# Patient Record
Sex: FEMALE | Race: WHITE | NOT HISPANIC OR LATINO | Employment: OTHER | URBAN - METROPOLITAN AREA
[De-identification: names, ages, dates, MRNs, and addresses within clinical notes are randomized per-mention and may not be internally consistent; named-entity substitution may affect disease eponyms.]

---

## 2017-08-11 ENCOUNTER — ALLSCRIPTS OFFICE VISIT (OUTPATIENT)
Dept: OTHER | Facility: OTHER | Age: 72
End: 2017-08-11

## 2017-08-11 DIAGNOSIS — Z12.31 ENCOUNTER FOR SCREENING MAMMOGRAM FOR MALIGNANT NEOPLASM OF BREAST: ICD-10-CM

## 2017-10-19 ENCOUNTER — GENERIC CONVERSION - ENCOUNTER (OUTPATIENT)
Dept: OTHER | Facility: OTHER | Age: 72
End: 2017-10-19

## 2018-01-12 VITALS
WEIGHT: 233.25 LBS | DIASTOLIC BLOOD PRESSURE: 72 MMHG | BODY MASS INDEX: 38.86 KG/M2 | HEIGHT: 65 IN | SYSTOLIC BLOOD PRESSURE: 122 MMHG

## 2019-12-18 ENCOUNTER — ANNUAL EXAM (OUTPATIENT)
Dept: OBGYN CLINIC | Facility: MEDICAL CENTER | Age: 74
End: 2019-12-18
Payer: COMMERCIAL

## 2019-12-18 VITALS — WEIGHT: 220 LBS | BODY MASS INDEX: 37.18 KG/M2 | SYSTOLIC BLOOD PRESSURE: 120 MMHG | DIASTOLIC BLOOD PRESSURE: 70 MMHG

## 2019-12-18 DIAGNOSIS — Z01.419 ENCOUNTER FOR WELL WOMAN EXAM WITH ROUTINE GYNECOLOGICAL EXAM: Primary | ICD-10-CM

## 2019-12-18 DIAGNOSIS — Z12.31 ENCOUNTER FOR SCREENING MAMMOGRAM FOR MALIGNANT NEOPLASM OF BREAST: ICD-10-CM

## 2019-12-18 PROCEDURE — G0101 CA SCREEN;PELVIC/BREAST EXAM: HCPCS | Performed by: OBSTETRICS & GYNECOLOGY

## 2019-12-18 RX ORDER — SODIUM PHOSPHATE,MONO-DIBASIC 19G-7G/118
1500 ENEMA (ML) RECTAL
COMMUNITY
Start: 2006-07-14

## 2019-12-18 RX ORDER — NIACIN 100 MG
100 TABLET ORAL
COMMUNITY
Start: 2019-05-08 | End: 2022-01-31 | Stop reason: ALTCHOICE

## 2019-12-18 NOTE — PROGRESS NOTES
ASSESSMENT & PLAN: Christelle Chang is a 75 y o  [id number] with normal gynecologic exam     1   Routine well woman exam done today  2  Pap and HPV:  The patient's last pap and hpv was unkown  It was normal     Pap with cotesting was not done today  Current ASCCP Guidelines reviewed  No paps necessary  3  Mammogram ordered  4  Colorectal cancer screening was notordered  5   The following were reviewed in today's visit: breast self exam and mammography screening ordered  6  DEXA     CC: Annual Gynecologic Examination    HPI: Christelle Chang is a 75 y o  [id number] who presents for annual gynecologic examination  She has the following concerns:  No GYN complaints; was diagnosed with DVT and PE this past year after a long trip, denies vaginal bleeding    Health Maintenance:    She wears her seatbelt routinely  She does perform regular monthly self breast exams  She feels safe at home  Last PAP & HPV: unknown, prior hysterectomy  Last mammogram:    Last colonoscopy:     Past Medical History:   Diagnosis Date    Blood clot in vein 2019    Right leg       History reviewed  No pertinent surgical history  Past OB/Gyn History:  OB History        2    Para   2    Term   2            AB        Living   2       SAB        TAB        Ectopic        Multiple        Live Births   2               History of abnormal pap smears: No        History reviewed  No pertinent family history      Social History:  Social History     Socioeconomic History    Marital status: /Civil Union     Spouse name: Not on file    Number of children: Not on file    Years of education: Not on file    Highest education level: Not on file   Occupational History    Not on file   Social Needs    Financial resource strain: Not on file    Food insecurity:     Worry: Not on file     Inability: Not on file    Transportation needs:     Medical: Not on file     Non-medical: Not on file   Tobacco Use    Smoking status: Never Smoker    Smokeless tobacco: Never Used   Substance and Sexual Activity    Alcohol use: Yes     Frequency: Monthly or less    Drug use: Never    Sexual activity: Not Currently     Birth control/protection: Post-menopausal   Lifestyle    Physical activity:     Days per week: Not on file     Minutes per session: Not on file    Stress: Not on file   Relationships    Social connections:     Talks on phone: Not on file     Gets together: Not on file     Attends Jehovah's witness service: Not on file     Active member of club or organization: Not on file     Attends meetings of clubs or organizations: Not on file     Relationship status: Not on file    Intimate partner violence:     Fear of current or ex partner: Not on file     Emotionally abused: Not on file     Physically abused: Not on file     Forced sexual activity: Not on file   Other Topics Concern    Not on file   Social History Narrative    Not on file         Allergies   Allergen Reactions    Medical Tape Other (See Comments)     "red rash"         Current Outpatient Medications:     apixaban (ELIQUIS) 5 mg, Take 5 mg by mouth 2 (two) times a day, Disp: , Rfl:     B COMPLEX VITAMINS PO, Take 1 tablet by mouth, Disp: , Rfl:     Calcium Carbonate-Vitamin D3 600-400 MG-UNIT TABS, One tablet by mouth daily, Disp: , Rfl:     Cholecalciferol 1 25 MG (66752 UT) capsule, Take 2,000 Units by mouth daily, Disp: , Rfl:     glucosamine sulfate 500 mg, Take 1,500 mg by mouth, Disp: , Rfl:     MECLIZINE HCL PO, Take 1 tablet by mouth, Disp: , Rfl:     niacin 100 mg tablet, Take 100 mg by mouth, Disp: , Rfl:     Review of Systems  Constitutional :no fever, feels well, no tiredness, no recent weight gain or loss  ENT: no ear ache, no loss of hearing, no nosebleeds or nasal discharge, no sore throat or hoarseness    Cardiovascular: no complaints of slow or fast heart beat, no chest pain, no palpitations, no leg claudication or lower extremity edema  Respiratory: no complaints of shortness of shortness of breath, no WHYTE  Breasts:no complaints of breast pain, breast lump, or nipple discharge  Gastrointestinal: no complaints of abdominal pain, constipation, nausea, vomiting, or diarrhea or bloody stools  Genitourinary : no complaints of dysuria, incontinence, pelvic pain, no dysmenorrhea, vaginal discharge or abnormal vaginal bleeding and as noted in HPI  Musculoskeletal: no complaints of arthralgia, no myalgia, no joint swelling or stiffness, no limb pain or swelling  Integumentary: no complaints of skin rash or lesion, itching or dry skin  Neurological: no complaints of headache, no confusion, no numbness or tingling, no dizziness or fainting    Objective      /70   Wt 99 8 kg (220 lb)   BMI 37 18 kg/m²   General:   appears stated age, cooperative, alert normal mood and affect   Neck: normal, supple,trachea midline, no masses   Heart: regular rate and rhythm, S1, S2 normal, no murmur, click, rub or gallop   Lungs: clear to auscultation bilaterally   Breasts: normal appearance, no masses or tenderness, Inspection negative, No nipple retraction or dimpling, No nipple discharge or bleeding, No axillary or supraclavicular adenopathy, Normal to palpation without dominant masses   Abdomen: soft, non-tender, without masses or organomegaly   Vulva: normal female genitalia, Bartholin's, Urethra, [place] normal, no lesions, normal female hair distribution   Vagina: normal vagina, no discharge, exudate, lesion, or erythema   Urethra: normal   Cervix: Absent  Uterus: uterus absent   Adnexa: no mass, fullness, tenderness   Lymphatic palpation of lymph nodes in neck, axilla, groin and/or other locations: no lymphadenopathy or masses noted   Skin normal skin turgor and no rashes     Psychiatric orientation to person, place, and time: normal  mood and affect: normal

## 2020-11-05 DIAGNOSIS — Z12.31 ENCOUNTER FOR SCREENING MAMMOGRAM FOR MALIGNANT NEOPLASM OF BREAST: ICD-10-CM

## 2021-01-27 ENCOUNTER — IMMUNIZATIONS (OUTPATIENT)
Dept: FAMILY MEDICINE CLINIC | Facility: HOSPITAL | Age: 76
End: 2021-01-27

## 2021-01-27 DIAGNOSIS — Z23 ENCOUNTER FOR IMMUNIZATION: Primary | ICD-10-CM

## 2021-01-27 PROCEDURE — 0011A SARS-COV-2 / COVID-19 MRNA VACCINE (MODERNA) 100 MCG: CPT

## 2021-01-27 PROCEDURE — 91301 SARS-COV-2 / COVID-19 MRNA VACCINE (MODERNA) 100 MCG: CPT

## 2021-02-22 ENCOUNTER — IMMUNIZATIONS (OUTPATIENT)
Dept: FAMILY MEDICINE CLINIC | Facility: HOSPITAL | Age: 76
End: 2021-02-22

## 2021-02-22 DIAGNOSIS — Z23 ENCOUNTER FOR IMMUNIZATION: Primary | ICD-10-CM

## 2021-02-22 PROCEDURE — 91301 SARS-COV-2 / COVID-19 MRNA VACCINE (MODERNA) 100 MCG: CPT

## 2021-02-22 PROCEDURE — 0012A SARS-COV-2 / COVID-19 MRNA VACCINE (MODERNA) 100 MCG: CPT

## 2022-01-31 ENCOUNTER — OFFICE VISIT (OUTPATIENT)
Dept: OBGYN CLINIC | Facility: MEDICAL CENTER | Age: 77
End: 2022-01-31
Payer: COMMERCIAL

## 2022-01-31 VITALS
SYSTOLIC BLOOD PRESSURE: 126 MMHG | BODY MASS INDEX: 35.16 KG/M2 | WEIGHT: 211 LBS | DIASTOLIC BLOOD PRESSURE: 78 MMHG | HEIGHT: 65 IN

## 2022-01-31 DIAGNOSIS — N76.4 VULVAR ABSCESS: Primary | ICD-10-CM

## 2022-01-31 PROCEDURE — 99213 OFFICE O/P EST LOW 20 MIN: CPT | Performed by: OBSTETRICS & GYNECOLOGY

## 2022-01-31 RX ORDER — CLINDAMYCIN PHOSPHATE 10 UG/ML
LOTION TOPICAL 2 TIMES DAILY
Qty: 60 ML | Refills: 0 | Status: SHIPPED | OUTPATIENT
Start: 2022-01-31 | End: 2022-02-01

## 2022-01-31 NOTE — PROGRESS NOTES
Assessment:  67 y o  [id number] who presents with vulvar abscess vs cellulitis  Exam favors free-draining abscess  Discussed warm compresses, sitz baths, and topical antibiotic ointment  Plan:  Diagnoses and all orders for this visit:    Vulvar abscess  -     clindamycin (CLEOCIN T) 1 % lotion; Apply topically 2 (two) times a day  - Encourage continued drainage with moist heat  - Discussed psb need for drainage if worsening, however no significant fluctuance appreciated today and may be draining on its own   - Return in 10d for reassessment (or sooner if worsening symptoms)        __________________________________________________________________    Subjective   Trung Bass is a 67 y o  [id number] who presents with lump on inner thigh/vulvar region  Started yesterday, started bleeding  No pus or other drainage noted  Prior to yesterday she wasn't noticing anything  The area is tender, but she cannot clearly see it to describe its appearance  No vaginal bleeding or discharge  No similar events prior  The following portions of the patient's history were reviewed and updated as appropriate: allergies, current medications, past medical history and problem list     Review of Systems  Review of Systems   Constitutional: Negative for chills, fatigue and fever  Respiratory: Negative for cough and shortness of breath  Cardiovascular: Negative for palpitations  Genitourinary: Positive for genital sores and vaginal pain (vulvar)  Negative for menstrual problem, pelvic pain, vaginal bleeding and vaginal discharge  Skin: Positive for wound  Objective  /78 (BP Location: Left arm, Patient Position: Sitting, Cuff Size: Adult)   Ht 5' 4 5" (1 638 m)   Wt 95 7 kg (211 lb)   BMI 35 66 kg/m²      Physical Exam:  Physical Exam  Exam conducted with a chaperone present  Constitutional:       General: She is not in acute distress  Appearance: Normal appearance   She is not ill-appearing, toxic-appearing or diaphoretic  Eyes:      General: No scleral icterus  Right eye: No discharge  Left eye: No discharge  Conjunctiva/sclera: Conjunctivae normal    Cardiovascular:      Rate and Rhythm: Normal rate  Pulmonary:      Effort: Pulmonary effort is normal  No respiratory distress  Genitourinary:     Exam position: Lithotomy position  Labia:         Right: No rash, tenderness or lesion  Left: Rash, tenderness and lesion (2 5 x 4cm well circumscribed area of erythema and induration  small opening with scant adherent blood  non-fluctuant  tender to palpation) present  Musculoskeletal:         General: No swelling  Skin:     General: Skin is warm and dry  Coloration: Skin is not jaundiced or pale  Findings: No bruising or erythema  Neurological:      Mental Status: She is alert  Psychiatric:         Mood and Affect: Mood normal          Behavior: Behavior normal          Thought Content:  Thought content normal          Judgment: Judgment normal

## 2022-02-18 ENCOUNTER — ANNUAL EXAM (OUTPATIENT)
Dept: OBGYN CLINIC | Facility: MEDICAL CENTER | Age: 77
End: 2022-02-18
Payer: COMMERCIAL

## 2022-02-18 VITALS
SYSTOLIC BLOOD PRESSURE: 120 MMHG | BODY MASS INDEX: 35.32 KG/M2 | WEIGHT: 212 LBS | HEIGHT: 65 IN | DIASTOLIC BLOOD PRESSURE: 72 MMHG

## 2022-02-18 DIAGNOSIS — B37.2 CUTANEOUS CANDIDIASIS: ICD-10-CM

## 2022-02-18 DIAGNOSIS — Z01.419 WELL WOMAN EXAM WITH ROUTINE GYNECOLOGICAL EXAM: Primary | ICD-10-CM

## 2022-02-18 DIAGNOSIS — M85.89 OSTEOPENIA OF MULTIPLE SITES: ICD-10-CM

## 2022-02-18 DIAGNOSIS — N76.4 VULVAR ABSCESS: ICD-10-CM

## 2022-02-18 DIAGNOSIS — Z12.31 SCREENING MAMMOGRAM FOR BREAST CANCER: ICD-10-CM

## 2022-02-18 DIAGNOSIS — Z12.11 COLON CANCER SCREENING: ICD-10-CM

## 2022-02-18 PROCEDURE — G0101 CA SCREEN;PELVIC/BREAST EXAM: HCPCS | Performed by: OBSTETRICS & GYNECOLOGY

## 2022-02-18 RX ORDER — NYSTATIN 100000 [USP'U]/G
POWDER TOPICAL 2 TIMES DAILY
Qty: 60 G | Refills: 3 | Status: SHIPPED | OUTPATIENT
Start: 2022-02-18

## 2022-02-18 RX ORDER — KETOCONAZOLE 20 MG/G
CREAM TOPICAL DAILY PRN
Qty: 30 G | Refills: 3 | Status: SHIPPED | OUTPATIENT
Start: 2022-02-18

## 2022-02-18 NOTE — PROGRESS NOTES
Assessment   67 y o   postmenopausal female who is s/p VIGNESH BSO (36yo, pelvic pain, fibroid) presenting for annual exam      Plan   Diagnoses and all orders for this visit:    Well woman exam with routine gynecological exam  - Pap not indicated  - Mammo and DEXA ordered  - Colon ca screening up to date  - Return in 1yr for yearly    Screening mammogram for breast cancer  -     Mammo screening bilateral w 3d & cad; Future    Colon cancer screening  - Up to date    Osteopenia of multiple sites  -     DXA bone density spine hip and pelvis; Future  - Taking Ca/Vitamin D    Cutaneous candidiasis  -     nystatin (MYCOSTATIN) powder; Apply topically 2 (two) times a day  -     hydrocortisone 2 5 % ointment; Apply topically daily as needed for rash  -     ketoconazole (NIZORAL) 2 % cream; Apply topically daily as needed for rash    Vulvar abscess  - Improving   - Continue to observe changes and return if persistent     __________________________________________________________________      Subjective     67 y o  postmenopausal female who is s/p VIGNESH, BSO (36yo, pelvic pain, fibroid) presenting for annual exam  She is without complaint  Recently seen for vulvar abscess  Feels markedly improved, but something still there  Using ketoconazole/hydrocortisone under skin fold when rash develops  Not often  Discussed prevention with keeping area clean/dry  Discussed corn starch vs medicated powders  GYN  Complaints: bump still on vulva  Denies genital discharge, genital ulcers and pelvic pain  Menopause occurred with hysterectomy (36yo)  She has had no bleeding since this time    Menopausal symptoms: none  Hx STI: denies   Hx Abnormal pap: denies  Last pap: pre-hyst    OB   ( x2)      Complaints: nocturia 2x  Denies urinary frequency, hematuria, urinary incontinence and dysuria    BREAST  Complaints: denies  Denies: breast lump, breast tenderness, changed mole, dryness, nipple discharge, pruritus, rash, skin color change and skin lesion(s)  Last mammogram: 2020 - birad1  Personal hx: bilateral breast bx, excisions, and cyst drainages  Family hx: denies fhx of breast, uterine, ovarian, or colon cancers  Patient does do regular self-exams    GENERAL  PMH reviewed/updated and is as below  Patient does follow with a PCP  Retired  Denies domestic violence  Exercise: walking  Diet: nothing specific    SCREENING  Cervical Ca: pap not indicated  Breast Ca: mammo ordered  Colon Ca: 2020 - cologuard - negative        Past Medical History:   Diagnosis Date    Blood clot in vein 2019    Right leg, PE       Past Surgical History:   Procedure Laterality Date    TONSILLECTOMY      TOTAL ABDOMINAL HYSTERECTOMY W/ BILATERAL SALPINGOOPHORECTOMY N/A 1980    fibroids         Current Outpatient Medications:     B COMPLEX VITAMINS PO, Take 1 tablet by mouth, Disp: , Rfl:     Calcium Carbonate-Vitamin D3 600-400 MG-UNIT TABS, One tablet by mouth daily, Disp: , Rfl:     Cholecalciferol 1 25 MG (90600 UT) capsule, Take 2,000 Units by mouth daily, Disp: , Rfl:     glucosamine sulfate 500 mg, Take 1,500 mg by mouth, Disp: , Rfl:     MECLIZINE HCL PO, Take 1 tablet by mouth, Disp: , Rfl:     mupirocin (BACTROBAN) 2 % ointment, Apply topically 3 (three) times a day, Disp: 22 g, Rfl: 0    hydrocortisone 2 5 % ointment, Apply topically daily as needed for rash, Disp: 30 g, Rfl: 3    ketoconazole (NIZORAL) 2 % cream, Apply topically daily as needed for rash, Disp: 30 g, Rfl: 3    nystatin (MYCOSTATIN) powder, Apply topically 2 (two) times a day, Disp: 60 g, Rfl: 3    Allergies   Allergen Reactions    Medical Tape Other (See Comments)     "red rash"       Social History     Tobacco Use    Smoking status: Never Smoker    Smokeless tobacco: Never Used   Substance Use Topics    Alcohol use:  Yes    Drug use: Never           Objective  /72 (BP Location: Left arm, Patient Position: Sitting, Cuff Size: Adult)   Ht 5' 4 5" (1 638 m) Wt 96 2 kg (212 lb)   BMI 35 83 kg/m²      Physical Exam:  Physical Exam  Exam conducted with a chaperone present  Constitutional:       General: She is not in acute distress  Appearance: Normal appearance  She is well-developed  She is not ill-appearing, toxic-appearing or diaphoretic  HENT:      Head: Normocephalic and atraumatic  Eyes:      General: No scleral icterus  Right eye: No discharge  Left eye: No discharge  Conjunctiva/sclera: Conjunctivae normal    Cardiovascular:      Rate and Rhythm: Normal rate  Pulmonary:      Effort: Pulmonary effort is normal  No accessory muscle usage or respiratory distress  Chest:   Breasts:      Right: No inverted nipple, mass, nipple discharge, skin change or tenderness  Left: No inverted nipple, mass, nipple discharge, skin change or tenderness  Abdominal:      General: There is no distension  Palpations: Abdomen is soft  There is no mass  Tenderness: There is no abdominal tenderness  There is no guarding or rebound  Genitourinary:     General: Normal vulva  Exam position: Lithotomy position  Labia:         Right: No rash, tenderness or lesion  Left: Lesion (<1cm, well circumscribed, dusky, non-tender area  no induration, fluctuance, or erythema) present  No rash or tenderness  Urethra: No prolapse, urethral swelling or urethral lesion  Vagina: No signs of injury  No vaginal discharge, erythema, tenderness or bleeding  Cervix: No cervical motion tenderness (surgically absent cervix  normal appearing, intact vaginal cuff)  Uterus: Absent  Adnexa:         Right: No mass (surgically absent bilaterally by report  non-palpable  ), tenderness or fullness  Left: No mass, tenderness or fullness  Rectum: No external hemorrhoid  Normal anal tone  Comments: Urethral meatus: normal  Skin:     General: Skin is warm and dry        Coloration: Skin is not jaundiced  Findings: No bruising, erythema or rash  Neurological:      Mental Status: She is alert  Psychiatric:         Mood and Affect: Mood normal          Behavior: Behavior normal          Thought Content:  Thought content normal          Judgment: Judgment normal

## 2024-11-05 ENCOUNTER — OFFICE VISIT (OUTPATIENT)
Dept: OBGYN CLINIC | Facility: CLINIC | Age: 79
End: 2024-11-05
Payer: COMMERCIAL

## 2024-11-05 ENCOUNTER — NURSE TRIAGE (OUTPATIENT)
Age: 79
End: 2024-11-05

## 2024-11-05 VITALS
DIASTOLIC BLOOD PRESSURE: 70 MMHG | SYSTOLIC BLOOD PRESSURE: 118 MMHG | BODY MASS INDEX: 31.16 KG/M2 | WEIGHT: 187 LBS | HEIGHT: 65 IN

## 2024-11-05 DIAGNOSIS — N90.7 SEBACEOUS CYST OF LABIA: Primary | ICD-10-CM

## 2024-11-05 PROBLEM — Z86.718 HISTORY OF DVT (DEEP VEIN THROMBOSIS): Chronic | Status: ACTIVE | Noted: 2021-06-04

## 2024-11-05 PROBLEM — Z79.01 CHRONIC ANTICOAGULATION: Status: ACTIVE | Noted: 2023-10-18

## 2024-11-05 PROBLEM — D17.71 ANGIOMYOLIPOMA OF RIGHT KIDNEY: Status: ACTIVE | Noted: 2024-11-05

## 2024-11-05 PROBLEM — Z86.711 HISTORY OF PULMONARY EMBOLISM: Status: ACTIVE | Noted: 2023-10-09

## 2024-11-05 PROBLEM — I35.1 AORTIC INSUFFICIENCY: Status: ACTIVE | Noted: 2020-11-11

## 2024-11-05 PROCEDURE — 99213 OFFICE O/P EST LOW 20 MIN: CPT | Performed by: NURSE PRACTITIONER

## 2024-11-05 RX ORDER — APIXABAN 5 MG/1
5 TABLET, FILM COATED ORAL 2 TIMES DAILY
COMMUNITY

## 2024-11-05 RX ORDER — NIACINAMIDE 500 MG
1 TABLET ORAL DAILY
COMMUNITY

## 2024-11-05 NOTE — TELEPHONE ENCOUNTER
"Patient has blister in outside right vaginal fold area, noticed approx. x 1 week ago. Patient denies the area is open or draining, about quarter in size. No vaginal discharge, no odor, no  itching, denies any injury to area. Denies any fever, no UTI symptoms. Patient states it hurts when she touches or wipes the area, very sensitive. Asked for appointment as soon as possible.   While searching for appointment, call was disconnected.   Returned call to patient and   Appointment scheduled for this afternoon and [employer] message sent to provider and clerical.      Answer Assessment - Initial Assessment Questions  1. SYMPTOM: \"What's the main symptom you're concerned about?\" (e.g., pain, itching, dryness)      Vaginal blister   2. LOCATION: \"Where is the  symptoms located?\" (e.g., inside/outside, left/right)      Right vaginal fold blister  3. ONSET: \"When did the  symptoms  start?\"      1 week ago  4. PAIN: \"Is there any pain?\" If Yes, ask: \"How bad is it?\" (Scale: 1-10; mild, moderate, severe)      With touching  5. ITCHING: \"Is there any itching?\" If Yes, ask: \"How bad is it?\" (Scale: 1-10; mild, moderate, severe)      denies  6. CAUSE: \"What do you think is causing the discharge?\" \"Have you had the same problem before?\" \"What happened then?\"      Denies discharge  7. OTHER SYMPTOMS: \"Do you have any other symptoms?\" (e.g., fever, itching, vaginal bleeding, pain with urination, injury to genital area, vaginal foreign body)      denies    Protocols used: Vaginal Symptoms-Adult-[place]    "

## 2024-11-05 NOTE — TELEPHONE ENCOUNTER
Patient calling back for RN, patient states she was talking with RN and call was disconnected, warm transfer to [facility], unusccessful. Please call patient back. Patient states she got an appt for today 11/05/2024,got another call while patient was waiting to be warm transfer to [facility].

## 2024-11-05 NOTE — PROGRESS NOTES
"Ambulatory Visit  Name: Jami Campbell      : 1945      MRN: [id number]  Encounter Provider: JOSEPHINE Alvarez  Encounter Date: 2024   Encounter department: [facility] [place]    Assessment & Plan  Sebaceous cyst of labia  -Reviewed diagnosis of sebaceous cyst.  Reassured patient no signs of infection.   -Signs and symptoms to report reviewed     RTO - PRN     History of Present Illness     Jami Campbell is a 79 y.o. female who presents with complaints of right labial/vaginal sore for about 1 week. LMP - postmenopausal, denies bleeding.  Since onset area has improved and decreased in size.  Started approximately the size of a grape.  Was slightly uncomfortable with wiping.  Denies associated symptoms, alleviating or aggravating factors.  Has not used any OTC remedies.  Has not had similar episodes in the past.  Has not noticed any increase in drainage.  Denies fever, chills, vaginal discharge/odor.    Last mammogram 23  Birads 2  DEXA 23- WNL     History obtained from : patient  Review of Systems   Constitutional:  Negative for chills, fatigue and fever.   Respiratory: Negative.     Cardiovascular: Negative.    Genitourinary:  Positive for genital sores.     Medical History Reviewed by provider this encounter:  Allergies  Meds  Problems           Objective     /70   Ht 5' 5\" (1.651 m)   Wt 84.8 kg (187 lb)   BMI 31.12 kg/m²     Physical Exam  Constitutional:       Appearance: Normal appearance. She is obese.   Neurological:      Mental Status: She is alert.   Psychiatric:         Mood and Affect: Mood normal.         Behavior: Behavior normal.         " No

## 2025-02-21 ENCOUNTER — NURSE TRIAGE (OUTPATIENT)
Age: 80
End: 2025-02-21

## 2025-02-21 NOTE — TELEPHONE ENCOUNTER
"Patient with urinary urgency, burning, unable to hold bladder and when she goes, small dribbles. Encouraged patient to go to the urgent care for an eval. Encouraged increasing water intake as well as drinking cranberry juice to help with symptoms of a UTI.  Reason for Disposition   Can't control passage of urine (i.e., urinary incontinence) and new-onset (< 2 weeks) or getting worse    Answer Assessment - Initial Assessment Questions  1. SYMPTOM: \"What's the main symptom you're concerned about?\" (e.g., frequency, incontinence)      Burning with urination, unable to hold bladder, urgency   2. ONSET: \"When did the  sx  start?\"      This morning  3. PAIN: \"Is there any pain?\" If Yes, ask: \"How bad is it?\" (Scale: 1-10; mild, moderate, severe)      Pain   4. CAUSE: \"What do you think is causing the symptoms?\"      Unsure   5. OTHER SYMPTOMS: \"Do you have any other symptoms?\" (e.g., blood in urine, fever, flank pain, pain with urination)      Denies   6. PREGNANCY: \"Is there any chance you are pregnant?\" \"When was your last menstrual period?\"      N/A    Protocols used: Urinary Symptoms-Adult-[place]    "

## 2025-05-30 ENCOUNTER — OFFICE VISIT (OUTPATIENT)
Dept: PODIATRY | Facility: CLINIC | Age: 80
End: 2025-05-30
Payer: COMMERCIAL

## 2025-05-30 VITALS — HEIGHT: 65 IN | BODY MASS INDEX: 31.16 KG/M2 | WEIGHT: 187 LBS

## 2025-05-30 DIAGNOSIS — L84 CALLUS OF FOOT: Primary | ICD-10-CM

## 2025-05-30 DIAGNOSIS — M79.671 RIGHT FOOT PAIN: ICD-10-CM

## 2025-05-30 PROCEDURE — 11056 PARNG/CUTG B9 HYPRKR LES 2-4: CPT

## 2025-05-30 PROCEDURE — 99203 OFFICE O/P NEW LOW 30 MIN: CPT

## 2025-05-30 RX ORDER — ONDANSETRON 4 MG/1
4 TABLET, ORALLY DISINTEGRATING ORAL EVERY 8 HOURS PRN
COMMUNITY
Start: 2025-01-29

## 2025-05-30 RX ORDER — CEPHALEXIN 500 MG/1
1 CAPSULE ORAL 2 TIMES DAILY
COMMUNITY
Start: 2025-02-21

## 2025-05-30 NOTE — PROGRESS NOTES
"Name: Jami Campbell      : 1945      MRN: [id number]  Encounter Provider: Larry Lagos DPM  Encounter Date: 2025   Encounter department: [facility]  :  Assessment & Plan  Callus of foot         Right foot pain               -Patient was educated regarding their condition  -Hyperkeratotic tissue was pared utilizing a #15 blade x1 without incident to level of healthy epidermis  -Patient was provided with padding to offload area  -Recommend use of moisturizer to feet daily  -Recommend use of pumice stone to callused areas  -Return to clinic 3 months    Lesion Destruction    Date/Time: 2025 8:30 AM    Performed by: Larry Lagos DPM  Authorized by: Larry Lagos DPM    Universal Protocol:  Consent given by: patient  Timeout called at: 2025 8:50 AM.  Patient identity confirmed: verbally with patient    Procedure Details - Lesion Destruction:     Number of Lesions:  2  Lesion 1:     Body area:  Lower extremity    Lower extremity location:  R foot    Malignancy: benign hyperkeratotic lesion      Destruction method: scissors used for extraction    Lesion 2:     Body area:  Lower extremity    Lower extremity location:  R foot    Malignancy: benign hyperkeratotic lesion      Destruction method: scissors used for extraction        History of Present Illness   HPI  Jami Campbell is a 79 y.o. female who presents today with callouses .  The pain is described as dull. This pain is located at bottom of right foot and in great toe. They has had this for 1 months . This was was first noted with walking. Pain is most often caused by pressure. Aggrevating  factors include walking or standing for a long period of time . Alleviating factors include cream at night. she has tried cream prior to this visit.       History obtained from: patient    Review of Systems  Medications Ordered Prior to Encounter[1]      Objective   Ht 5' 5\" (1.651 m)   Wt 84.8 kg (187 lb)   BMI 31.12 kg/m²    "   Physical Exam    Cardiovascular:      Pulses:           Dorsalis pedis pulses are 1+ on the right side.        Posterior tibial pulses are 1+ on the right side.     Musculoskeletal:      Right foot: Decreased range of motion. Prominent metatarsal heads present.        Feet:    Feet:      Right foot:      Protective Sensation: 10 sites tested.  10 sites sensed.      Skin integrity: Callus present.                  [1]   Current Outpatient Medications on File Prior to Visit   Medication Sig Dispense Refill    B Complex Vitamins (VITAMIN B COMPLEX PO) Take by mouth      B COMPLEX VITAMINS PO Take 1 tablet by mouth      Calcium Carbonate-Vitamin D3 600-400 MG-UNIT TABS       cephalexin (KEFLEX) 500 mg capsule Take 1 capsule by mouth in the morning and 1 capsule before bedtime.      Cholecalciferol 1.25 MG (25162 UT) capsule Take 2,000 Units by mouth in the morning.      Eliquis 5 MG Take 5 mg by mouth in the morning and 5 mg in the evening.      glucosamine sulfate 500 mg Take 1,500 mg by mouth      MECLIZINE HCL PO Take 1 tablet by mouth      niacinamide 500 mg tablet Take 1 tablet by mouth in the morning      NIACINAMIDE ER PO Take by mouth      ondansetron (ZOFRAN-ODT) 4 mg disintegrating tablet Apply 4 mg to cheek every 8 (eight) hours as needed      [DISCONTINUED] clindamycin (CLEOCIN T) 1 % lotion Apply topically 2 (two) times a day 60 mL 0     No current facility-administered medications on file prior to visit.

## 2025-06-30 ENCOUNTER — OFFICE VISIT (OUTPATIENT)
Dept: PODIATRY | Facility: CLINIC | Age: 80
End: 2025-06-30
Payer: COMMERCIAL

## 2025-06-30 VITALS — WEIGHT: 170 LBS | BODY MASS INDEX: 28.32 KG/M2 | HEIGHT: 65 IN

## 2025-06-30 DIAGNOSIS — L97.511 ULCER OF TOE OF RIGHT FOOT, LIMITED TO BREAKDOWN OF SKIN (HCC): Primary | ICD-10-CM

## 2025-06-30 PROCEDURE — 99213 OFFICE O/P EST LOW 20 MIN: CPT | Performed by: PODIATRIST

## 2025-06-30 NOTE — PROGRESS NOTES
"               PATIENT:  Jami Campbell  1945       ASSESSMENT:     1. Ulcer of toe of right foot, limited to breakdown of skin (HCC)  Post Op Shoe                PLAN:  1. Reviewed medical records.  Patient was counseled and educated on the condition and the diagnosis.    2. The diagnosis, treatment options and prognosis were discussed with the patient.    3. She developed blisters / wound in right great.  Keratosis/ epidermolysis were removed.  Betadine and DSD daily.    4. Rx: Surgical shoe for off-loading.  Discussed level of activity.    5. Patient will return in 2 weeks for re-evaluation.       Imaging: I have personally reviewed pertinent films in PACS  Labs, pathology, and Other Studies: I have personally reviewed pertinent reports.        Subjective:       HPI  The patient presents with chief complaint of right great toe discoloration.  She has black and blue in right great toe in the last couple of weeks.  No significant pain.  No drainage.  No history of diabetes.       The following portions of the patient's history were reviewed and updated as appropriate: allergies, current medications, past family history, past medical history, past social history, past surgical history and problem list.  All pertinent labs and images were reviewed.      Past Medical History  Past Medical History[1]    Past Surgical History  Past Surgical History[2]     Allergies:  Medical tape    Medications:  Current Medications[3]    Social History:  Social History[4]       Review of Systems   Constitutional:  Negative for chills and fever.   Respiratory:  Negative for cough and shortness of breath.    Cardiovascular:  Negative for chest pain.   Gastrointestinal:  Negative for nausea and vomiting.   Musculoskeletal:  Negative for gait problem.   Neurological:  Negative for weakness.       Objective:      Ht 5' 5\" (1.651 m)   Wt 77.1 kg (170 lb) Comment: verbal  BMI 28.29 kg/m²          Physical Exam  Vitals reviewed. "   Constitutional:       General: She is not in acute distress.     Appearance: She is not toxic-appearing or diaphoretic.   HENT:      Head: Normocephalic and atraumatic.     Eyes:      Extraocular Movements: Extraocular movements intact.       Cardiovascular:      Rate and Rhythm: Normal rate and regular rhythm.      Pulses: Normal pulses.           Dorsalis pedis pulses are 2+ on the right side and 2+ on the left side.        Posterior tibial pulses are 2+ on the right side and 2+ on the left side.   Pulmonary:      Effort: Pulmonary effort is normal. No respiratory distress.     Musculoskeletal:         General: No swelling or signs of injury.      Cervical back: Normal range of motion and neck supple.      Right lower leg: Edema present.      Left lower leg: Edema present.      Right foot: No foot drop.      Left foot: No foot drop.     Skin:     General: Skin is warm.      Capillary Refill: Capillary refill takes less than 2 seconds.      Coloration: Skin is not cyanotic or mottled.      Findings: No abscess.      Nails: There is no clubbing.      Comments: Epidermolysis / keratosis noted right great toe distally.  Hematoma noted under the skin.  Partial thick wound noted under the lesion.  No deep probing or signs of infection.         Neurological:      General: No focal deficit present.      Mental Status: She is alert and oriented to person, place, and time.      Cranial Nerves: No cranial nerve deficit.      Sensory: No sensory deficit.      Motor: No weakness.      Coordination: Coordination normal.     Psychiatric:         Mood and Affect: Mood normal.         Behavior: Behavior normal.         Thought Content: Thought content normal.         Judgment: Judgment normal.              [1]  Past Medical History:  Diagnosis Date   • Blood clot in vein 2019    Right leg, PE   [2]  Past Surgical History:  Procedure Laterality Date   • TONSILLECTOMY     • TOTAL ABDOMINAL HYSTERECTOMY W/ BILATERAL  SALPINGOOPHORECTOMY N/A 1980    fibroids   • US GUIDED THYROID BIOPSY  2/24/2022   [3]  Current Outpatient Medications   Medication Sig Dispense Refill   • B Complex Vitamins (VITAMIN B COMPLEX PO) Take by mouth     • B COMPLEX VITAMINS PO Take 1 tablet by mouth     • Calcium Carbonate-Vitamin D3 600-400 MG-UNIT TABS      • cephalexin (KEFLEX) 500 mg capsule Take 1 capsule by mouth in the morning and 1 capsule before bedtime.     • Cholecalciferol 1.25 MG (37621 UT) capsule Take 2,000 Units by mouth in the morning.     • Eliquis 5 MG Take 5 mg by mouth in the morning and 5 mg in the evening.     • glucosamine sulfate 500 mg Take 1,500 mg by mouth     • MECLIZINE HCL PO Take 1 tablet by mouth     • niacinamide 500 mg tablet Take 1 tablet by mouth in the morning     • NIACINAMIDE ER PO Take by mouth     • ondansetron (ZOFRAN-ODT) 4 mg disintegrating tablet Apply 4 mg to cheek every 8 (eight) hours as needed       No current facility-administered medications for this visit.   [4]  Social History  Socioeconomic History   • Marital status: /Civil Union   Tobacco Use   • Smoking status: Never   • Smokeless tobacco: Never   Substance and Sexual Activity   • Alcohol use: Yes   • Drug use: Never   • Sexual activity: Not Currently     Birth control/protection: Post-menopausal     Social Drivers of Health     Financial Resource Strain: Low Risk  (10/9/2023)    Received from [facility]    Overall Financial Resource Strain (CARDIA)    • Difficulty of Paying Living Expenses: Not very hard   Food Insecurity: No Food Insecurity (10/9/2023)    Received from [facility]    Hunger Vital Sign    • Within the past 12 months, you worried that your food would run out before you got the money to buy more.: Never true    • Within the past 12 months, the food you bought just didn't last and you didn't have money to get more.: Never true   Transportation Needs: No Transportation Needs (10/9/2023)     Received from [facility]    PRAPARE - Transportation    • Lack of Transportation (Medical): No    • Lack of Transportation (Non-Medical): No   Intimate Partner Violence: Not At Risk (10/9/2023)    Received from [facility]    Humiliation, Afraid, Rape, and Kick questionnaire    • Fear of Current or Ex-Partner: No    • Emotionally Abused: No    • Physically Abused: No    • Sexually Abused: No

## 2025-07-14 ENCOUNTER — OFFICE VISIT (OUTPATIENT)
Dept: PODIATRY | Facility: CLINIC | Age: 80
End: 2025-07-14
Payer: COMMERCIAL

## 2025-07-14 VITALS — HEIGHT: 65 IN | BODY MASS INDEX: 28.32 KG/M2 | WEIGHT: 170 LBS

## 2025-07-14 DIAGNOSIS — L97.511 ULCER OF TOE OF RIGHT FOOT, LIMITED TO BREAKDOWN OF SKIN (HCC): Primary | ICD-10-CM

## 2025-07-14 PROCEDURE — 99213 OFFICE O/P EST LOW 20 MIN: CPT | Performed by: PODIATRIST

## 2025-07-14 NOTE — PROGRESS NOTES
"Name: Jami Campbell      : 1945      MRN: [id number]  Encounter Provider: Satnam Mckinney DPM  Encounter Date: 2025   Encounter department: [facility] [place]  :  Assessment & Plan  Ulcer of toe of right foot, limited to breakdown of skin (HCC)       1. Reviewed medical records.  Patient was counseled and educated on the condition and the diagnosis.    2. The diagnosis, treatment options and prognosis were discussed with the patient.    3. Removed all eschar/ keratosis.  Wound looks healed.  Discussed proper skin care and protection.  Try 40% Urea cream.    4. Okay to slowly advance shoes.  Patient will return in 4 weeks for re-evaluation.         Imaging: I have personally reviewed pertinent films in PACS  Labs, pathology, and Other Studies: I have personally reviewed pertinent reports.              History of Present Illness   HPI  Jami Campbell is a 80 y.o. female who presents for follow-up on wound right great toe.  She feels well with no pain.  No drainage or redness around right great toe.  No history of diabetes.           Review of Systems   Constitutional:  Negative for chills and fever.   Respiratory:  Negative for cough and shortness of breath.    Cardiovascular:  Negative for chest pain.   Gastrointestinal:  Negative for nausea and vomiting.   Musculoskeletal:  Negative for gait problem.   Neurological:  Negative for weakness.          Objective   Ht 5' 5\" (1.651 m)   Wt 77.1 kg (170 lb)   BMI 28.29 kg/m²      Physical Exam  Constitutional:       General: She is not in acute distress.     Appearance: She is well-developed. She is not toxic-appearing or diaphoretic.     Cardiovascular:      Rate and Rhythm: Normal rate and regular rhythm.      Pulses: Normal pulses.           Dorsalis pedis pulses are 2+ on the right side and 2+ on the left side.        Posterior tibial pulses are 2+ on the right side and 2+ on the left side.   Pulmonary:      Effort: Pulmonary effort is normal. No " respiratory distress.     Musculoskeletal:         General: Deformity present. No signs of injury.      Right lower leg: Edema present.      Left lower leg: Edema present.      Right foot: No foot drop.      Left foot: No foot drop.      Comments: Hammertoes.     Skin:     General: Skin is warm.      Capillary Refill: Capillary refill takes less than 2 seconds.      Coloration: Skin is not cyanotic or mottled.      Findings: No abscess.      Nails: There is no clubbing.      Comments: Dry blood  / keratosis noted right great toe distally.  It was removed and wound is fully healed.  No signs of infection.          Neurological:      General: No focal deficit present.      Mental Status: She is alert and oriented to person, place, and time.      Cranial Nerves: No cranial nerve deficit.      Motor: No weakness.      Coordination: Coordination normal.     Psychiatric:         Mood and Affect: Mood normal.         Behavior: Behavior normal.         Thought Content: Thought content normal.         Judgment: Judgment normal.

## 2025-07-14 NOTE — ASSESSMENT & PLAN NOTE
1. Reviewed medical records.  Patient was counseled and educated on the condition and the diagnosis.    2. The diagnosis, treatment options and prognosis were discussed with the patient.    3. Removed all eschar/ keratosis.  Wound looks healed.  Discussed proper skin care and protection.  Try 40% Urea cream.    4. Okay to slowly advance shoes.  Patient will return in 4 weeks for re-evaluation.         Imaging: I have personally reviewed pertinent films in PACS  Labs, pathology, and Other Studies: I have personally reviewed pertinent reports.

## 2025-08-22 ENCOUNTER — OFFICE VISIT (OUTPATIENT)
Dept: PODIATRY | Facility: CLINIC | Age: 80
End: 2025-08-22
Payer: COMMERCIAL

## 2025-08-22 VITALS — BODY MASS INDEX: 28.32 KG/M2 | WEIGHT: 170 LBS | HEIGHT: 65 IN

## 2025-08-22 DIAGNOSIS — M20.61 ACQUIRED DEFORMITY OF RIGHT TOE: ICD-10-CM

## 2025-08-22 DIAGNOSIS — L97.511 ULCER OF TOE OF RIGHT FOOT, LIMITED TO BREAKDOWN OF SKIN (HCC): Primary | ICD-10-CM

## 2025-08-22 DIAGNOSIS — L85.1 ACQUIRED KERATODERMA: ICD-10-CM

## 2025-08-22 PROBLEM — I73.9 PERIPHERAL VASCULAR DISEASE (HCC): Status: ACTIVE | Noted: 2025-08-22

## 2025-08-22 PROCEDURE — 99213 OFFICE O/P EST LOW 20 MIN: CPT | Performed by: PODIATRIST
